# Patient Record
Sex: FEMALE | Race: WHITE | Employment: FULL TIME | URBAN - METROPOLITAN AREA
[De-identification: names, ages, dates, MRNs, and addresses within clinical notes are randomized per-mention and may not be internally consistent; named-entity substitution may affect disease eponyms.]

---

## 2018-05-14 PROBLEM — Z87.42 HISTORY OF ABNORMAL CERVICAL PAP SMEAR: Status: ACTIVE | Noted: 2018-05-14

## 2019-05-22 PROBLEM — N94.6 DYSMENORRHEA: Status: ACTIVE | Noted: 2017-05-10

## 2019-09-08 ENCOUNTER — HOSPITAL ENCOUNTER (EMERGENCY)
Age: 29
Discharge: HOME | End: 2019-09-08
Attending: EMERGENCY MEDICINE
Payer: COMMERCIAL

## 2019-09-08 ENCOUNTER — APPOINTMENT (OUTPATIENT)
Dept: GENERAL RADIOLOGY | Age: 29
End: 2019-09-08
Attending: NURSE PRACTITIONER
Payer: COMMERCIAL

## 2019-09-08 VITALS
DIASTOLIC BLOOD PRESSURE: 80 MMHG | TEMPERATURE: 98.1 F | HEIGHT: 62 IN | RESPIRATION RATE: 16 BRPM | WEIGHT: 150 LBS | HEART RATE: 74 BPM | SYSTOLIC BLOOD PRESSURE: 121 MMHG | BODY MASS INDEX: 27.6 KG/M2 | OXYGEN SATURATION: 100 %

## 2019-09-08 DIAGNOSIS — S90.31XA CONTUSION OF RIGHT FOOT, INITIAL ENCOUNTER: ICD-10-CM
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
## 2019-09-08 DIAGNOSIS — S42.291A CLOSED FRACTURE OF HEAD OF RIGHT HUMERUS, INITIAL ENCOUNTER: ICD-10-CM

## 2019-09-08 PROCEDURE — 99283 EMERGENCY DEPT VISIT LOW MDM: CPT | Performed by: NURSE PRACTITIONER

## 2019-09-08 PROCEDURE — 73060 X-RAY EXAM OF HUMERUS: CPT

## 2019-09-08 PROCEDURE — 73630 X-RAY EXAM OF FOOT: CPT

## 2019-09-08 NOTE — LETTER
[id number] [address] EMERGENCY DEPT 
[address] [facility] [id number] [phone] Work/School Note Date: 9/8/2019 To Whom It May concern: 
 
Buzz Inks was seen and treated today in the emergency room by the following provider(s): 
Attending Provider: Emma Ngo MD 
Nurse Practitioner: GENE Oates. Buzz Inks needs to be excused from work until evaluated by orthopedics.   
 
Sincerely, 
 
 
 
 
GENE Velasco

## 2019-09-08 NOTE — DISCHARGE INSTRUCTIONS
Follow up with orthopedics. Continue with pain medication you were prescribed at [facility]. Return to the emergency department as needed.
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
## 2019-09-08 NOTE — ED NOTES
I have reviewed discharge instructions with the patient. The patient verbalized understanding. Patient left ED via Discharge Method: ambulatory to Home with family. Opportunity for questions and clarification provided. Patient given 0 scripts. To continue your aftercare when you leave the hospital, you may receive an automated call from our care team to check in on how you are doing. This is a free service and part of our promise to provide the best care and service to meet your aftercare needs.  If you have questions, or wish to unsubscribe from this service please call [phone]. Thank you for Choosing our [facility] Emergency Department.
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
## 2019-09-09 PROBLEM — S42.231A CLOSED 3-PART FRACTURE OF SURGICAL NECK OF RIGHT HUMERUS: Status: ACTIVE | Noted: 2019-09-09

## 2019-09-09 NOTE — BRIEF OP NOTE
BRIEF OPERATIVE NOTE    Date of Procedure: 9/12/2019     Preoperative Diagnosis:  CLOSED 4 PART RIGHT PROXIMAL HUMERUS FRACTURE     Postoperative Diagnosis:  SAME    Procedure(s): OPEN REDUCTION, INTERNAL FIXATION RIGHT PROXIMAL HUMERUS FRACTURE    Surgeon(s) and Role:     * Stanislaw Sanchez MD - Primary         Assistant Staff:  Baljinder Cochran CFA      Surgical Staff:  Circ-1: (Unknown)  Scrub Tech-1: (Unknown)  Scrub Tech-2: (Unknown)  Scrub Tech-3: (Unknown)  No case tracking events are documented in the log. Anesthesia:  GENERAL WITH INTERSCALENE BLOCK    Estimated Blood Loss: 493 cc      Complications: NONE    Implants:   Implant Name Type Inv.  Item Serial No.  Lot No. LRB No. Used Action   PLATE HUM PROX STD Raz Say Aron 1636 [id number] --  - [id number]  PLATE HUM PROX STD Raz Say Aron 1636 [id number] --  [id number] [address] [id number] Right 1 Implanted   WIRE K THRD TRCR PT [id number] --  - [id number]  WIRE K THRD TRCR PT 1.6U698SM --  [id number] [address] [id number] Right 2 Implanted   WIRE K TRCR PT 2.1R676KR SS -- 10/PK - [id number]  WIRE K TRCR PT 2.8J204HI SS -- 10/PK [id number] [employer] [place] [id number] Right 1 Implanted   SCR BNE LCK ST T25 3.5X26MM SS --  - [id number]  SCR BNE LCK ST T25 3.5X26MM SS --  [id number] [employer] [place] [id number] Right 4 Implanted   SCR BNE LCK ST T25 3.5X28MM SS --  - [id number]  SCR BNE LCK ST T25 3.5X28MM SS --  [id number] [employer] [place] [id number] Right 3 Implanted   SCR BNE LCK ST T25 3.5X36MM SS --  - [id number]  SCR BNE LCK ST T25 3.5X36MM SS --  [id number] [employer] [place] [id number] Right 3 Implanted   SCR BNE LCK ST T25 3.5X38MM SS --  - [id number]  SCR BNE LCK ST T25 3.5X38MM SS --  [id number] [employer] [place] [id number] Right 1 Implanted   SCR BNE LCK ST T25 3.5X40MM SS --  - [id number]  SCR BNE LCK ST T25 3.5X40MM SS --  [id number] [employer] [place] [id number] Right 2 Implanted   SCR BNE LCK ST T25 3.5X42MM SS --  - [id number]  SCR BNE LCK ST T25 3.5X42MM SS --  [id number] [employer] [place] [id number] Right 1 Implanted SCR BNE LCK ST T25 3.5X50MM SS --  - [id number]  SCR BNE LCK ST T25 3.5X50MM SS --  [id number] [employer] [place] [id number] Right 1 Implanted   SCR BNE LCK ST T25 3.5X44MM SS --  - [id number]  SCR BNE LCK ST T25 3.5X44MM SS --  [id number] [employer] [place] [id number] Right 2 Implanted   SCR BNE LCK ST T25 3.5X46MM SS --  - [id number]  SCR BNE LCK ST T25 3.5X46MM SS --  [id number] [facility] [id number] Right 1 Implanted       Allison Herrera MD
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
## 2019-09-10 ENCOUNTER — HOSPITAL ENCOUNTER (OUTPATIENT)
Dept: SURGERY | Age: 29
Discharge: HOME | End: 2019-09-10

## 2019-09-10 PROBLEM — S42.231A CLOSED 3-PART FRACTURE OF SURGICAL NECK OF RIGHT HUMERUS: Status: RESOLVED | Noted: 2019-09-09 | Resolved: 2019-09-10

## 2019-09-10 PROBLEM — S42.241A CLOSED 4-PART FRACTURE OF SURGICAL NECK OF RIGHT HUMERUS: Status: ACTIVE | Noted: 2019-09-10

## 2019-09-10 RX ORDER — CEFAZOLIN SODIUM/WATER 2 G/20 ML
2 SYRINGE (ML) INTRAVENOUS ONCE
Status: CANCELLED | OUTPATIENT
Start: 2019-09-10 | End: 2019-09-10

## 2019-09-10 RX ORDER — SODIUM CHLORIDE 0.9 % (FLUSH) 0.9 %
5-40 SYRINGE (ML) INJECTION EVERY 8 HOURS
Status: CANCELLED | OUTPATIENT
Start: 2019-09-10

## 2019-09-10 RX ORDER — SODIUM CHLORIDE 0.9 % (FLUSH) 0.9 %
5-40 SYRINGE (ML) INJECTION AS NEEDED
Status: CANCELLED | OUTPATIENT
Start: 2019-09-10

## 2019-09-11 VITALS — WEIGHT: 150 LBS | BODY MASS INDEX: 27.6 KG/M2 | HEIGHT: 62 IN

## 2019-09-11 RX ORDER — HYDROCODONE BITARTRATE AND ACETAMINOPHEN 10; 325 MG/1; MG/1
1 TABLET ORAL
COMMUNITY
End: 2021-07-01

## 2019-09-11 RX ORDER — IBUPROFEN 800 MG/1
800 TABLET ORAL
COMMUNITY
End: 2020-02-03

## 2019-09-11 NOTE — PERIOP NOTES
Patient verified name and . Order for consent found in EHR and matches case posting; patient verifies procedure. Right proximal humerus open reduction internal fixation. Type 1B surgery, PAT phone assessment complete. Orders received. Labs per surgeon: POC glucose and HgbA1C DOS. Labs per anesthesia protocol: none    Patient answered medical/surgical history questions at their best of ability. All prior to admission medications documented in [facility]. Patient instructed to take the following medications the day of surgery according to anesthesia guidelines with a small sip of water: hydrocodone if needed, lexapro, xyzal and philith. Hold all vitamins 7 days prior to surgery and NSAIDS 5 days prior to surgery. Prescription meds to hold: ibuprofen. Patient instructed on the following:  Arrive at A Entrance, time of arrival to be called the day before by 1700  NPO after midnight including gum, mints, and ice chips  Responsible adult must drive patient to the hospital, stay during surgery, and patient will need supervision 24 hours after anesthesia  Use hibiclens in shower the night before surgery and on the morning of surgery  All piercings must be removed prior to arrival.    Leave all valuables (money and jewelry) at home but bring insurance card and ID on  DOS. Do not wear make-up, nail polish, lotions, cologne, perfumes, powders, or oil on skin. Patient teach back successful and patient demonstrates knowledge of instruction.

## 2019-09-12 ENCOUNTER — ANESTHESIA EVENT (OUTPATIENT)
Dept: SURGERY | Age: 29
End: 2019-09-12
Payer: COMMERCIAL

## 2019-09-12 ENCOUNTER — HOSPITAL ENCOUNTER (OUTPATIENT)
Age: 29
Setting detail: OBSERVATION
Discharge: HOME | End: 2019-09-13
Attending: ORTHOPAEDIC SURGERY | Admitting: ORTHOPAEDIC SURGERY
Payer: COMMERCIAL

## 2019-09-12 ENCOUNTER — APPOINTMENT (OUTPATIENT)
Dept: GENERAL RADIOLOGY | Age: 29
End: 2019-09-12
Attending: ORTHOPAEDIC SURGERY
Payer: COMMERCIAL

## 2019-09-12 ENCOUNTER — ANESTHESIA (OUTPATIENT)
Dept: SURGERY | Age: 29
End: 2019-09-12
Payer: COMMERCIAL

## 2019-09-12 PROBLEM — S42.231A 3-PART FRACTURE OF SURGICAL NECK OF RIGHT HUMERUS, INITIAL ENCOUNTER FOR CLOSED FRACTURE: Status: ACTIVE | Noted: 2019-09-12

## 2019-09-12 PROBLEM — S42.231A 3-PART FRACTURE OF SURGICAL NECK OF RIGHT HUMERUS: Status: ACTIVE | Noted: 2019-09-12

## 2019-09-12 LAB
EST. AVERAGE GLUCOSE BLD GHB EST-MCNC: NORMAL MG/DL
GLUCOSE BLD STRIP.AUTO-MCNC: 99 MG/DL (ref 65–100)
HBA1C MFR BLD: 4.6 %
HCG UR QL: NEGATIVE

## 2019-09-12 PROCEDURE — 74011250636 HC RX REV CODE- 250/636: Performed by: ORTHOPAEDIC SURGERY

## 2019-09-12 PROCEDURE — 99218 HC RM OBSERVATION: CPT

## 2019-09-12 PROCEDURE — 77030008848 HC WRE K SYNT -B: Performed by: ORTHOPAEDIC SURGERY

## 2019-09-12 PROCEDURE — 77030029637: Performed by: ORTHOPAEDIC SURGERY

## 2019-09-12 PROCEDURE — 76010000171 HC OR TIME 2 TO 2.5 HR INTENSV-TIER 1: Performed by: ORTHOPAEDIC SURGERY

## 2019-09-12 PROCEDURE — 77030008703 HC TU ET UNCUF COVD -A: Performed by: ANESTHESIOLOGY

## 2019-09-12 PROCEDURE — 82962 GLUCOSE BLOOD TEST: CPT

## 2019-09-12 PROCEDURE — 94760 N-INVAS EAR/PLS OXIMETRY 1: CPT

## 2019-09-12 PROCEDURE — 76210000016 HC OR PH I REC 1 TO 1.5 HR: Performed by: ORTHOPAEDIC SURGERY

## 2019-09-12 PROCEDURE — 77030016570 HC BLNKT BAIR HGGR 3M -B: Performed by: ANESTHESIOLOGY

## 2019-09-12 PROCEDURE — 77030031139 HC SUT VCRL2 J&J -A: Performed by: ORTHOPAEDIC SURGERY

## 2019-09-12 PROCEDURE — 74011250637 HC RX REV CODE- 250/637: Performed by: ORTHOPAEDIC SURGERY

## 2019-09-12 PROCEDURE — 73030 X-RAY EXAM OF SHOULDER: CPT

## 2019-09-12 PROCEDURE — 77030019908 HC STETH ESOPH SIMS -A: Performed by: ANESTHESIOLOGY

## 2019-09-12 PROCEDURE — 77030008477 HC STYL SATN SLP COVD -A: Performed by: ANESTHESIOLOGY

## 2019-09-12 PROCEDURE — C1713 ANCHOR/SCREW BN/BN,TIS/BN: HCPCS | Performed by: ORTHOPAEDIC SURGERY

## 2019-09-12 PROCEDURE — 76942 ECHO GUIDE FOR BIOPSY: CPT | Performed by: ORTHOPAEDIC SURGERY

## 2019-09-12 PROCEDURE — 77030018836 HC SOL IRR NACL ICUM -A: Performed by: ORTHOPAEDIC SURGERY

## 2019-09-12 PROCEDURE — 74011250636 HC RX REV CODE- 250/636: Performed by: ANESTHESIOLOGY

## 2019-09-12 PROCEDURE — 77030018986 HC SUT ETHBND4 J&J -B: Performed by: ORTHOPAEDIC SURGERY

## 2019-09-12 PROCEDURE — 77030013107 HC CUF CRYOCUF DJOR -B: Performed by: ORTHOPAEDIC SURGERY

## 2019-09-12 PROCEDURE — 77030011283 HC ELECTRD NDL COVD -A: Performed by: ORTHOPAEDIC SURGERY

## 2019-09-12 PROCEDURE — 77030039425 HC BLD LARYNG TRULITE DISP TELE -A: Performed by: ANESTHESIOLOGY

## 2019-09-12 PROCEDURE — 77030003602 HC NDL NRV BLK BBMI -B: Performed by: ANESTHESIOLOGY

## 2019-09-12 PROCEDURE — 73060 X-RAY EXAM OF HUMERUS: CPT

## 2019-09-12 PROCEDURE — 77030018673: Performed by: ORTHOPAEDIC SURGERY

## 2019-09-12 PROCEDURE — 77030014058 HC TIP IRR PULSVC ZIMM -A: Performed by: ORTHOPAEDIC SURGERY

## 2019-09-12 PROCEDURE — 76010010054 HC POST OP PAIN BLOCK: Performed by: ORTHOPAEDIC SURGERY

## 2019-09-12 PROCEDURE — 77030040361 HC SLV COMPR DVT MDII -B: Performed by: ORTHOPAEDIC SURGERY

## 2019-09-12 PROCEDURE — 77030020163 HC SEAL HEMSTAT HALY -B: Performed by: ORTHOPAEDIC SURGERY

## 2019-09-12 PROCEDURE — 77030013708 HC HNDPC SUC IRR PULS STRY –B: Performed by: ORTHOPAEDIC SURGERY

## 2019-09-12 PROCEDURE — 94762 N-INVAS EAR/PLS OXIMTRY CONT: CPT

## 2019-09-12 PROCEDURE — 83036 HEMOGLOBIN GLYCOSYLATED A1C: CPT

## 2019-09-12 PROCEDURE — 76060000035 HC ANESTHESIA 2 TO 2.5 HR: Performed by: ORTHOPAEDIC SURGERY

## 2019-09-12 PROCEDURE — 77030008847 HC WRE K SYNT -A: Performed by: ORTHOPAEDIC SURGERY

## 2019-09-12 PROCEDURE — 74011250636 HC RX REV CODE- 250/636

## 2019-09-12 PROCEDURE — 77030029359 HC PRB ESOPH TEMP CATH ANTM -F: Performed by: ANESTHESIOLOGY

## 2019-09-12 PROCEDURE — 74011000250 HC RX REV CODE- 250

## 2019-09-12 PROCEDURE — 77030018547 HC SUT ETHBND1 J&J -B: Performed by: ORTHOPAEDIC SURGERY

## 2019-09-12 PROCEDURE — 81025 URINE PREGNANCY TEST: CPT

## 2019-09-12 DEVICE — SCREW BNE L46MM DIA3.5MM S STL ST FULL THRD T15 STARDRV: Type: IMPLANTABLE DEVICE | Site: SHOULDER | Status: FUNCTIONAL

## 2019-09-12 DEVICE — SCREW BNE L42MM DIA3.5MM CORT S STL ST LOK FULL THRD: Type: IMPLANTABLE DEVICE | Site: SHOULDER | Status: FUNCTIONAL

## 2019-09-12 DEVICE — SCREW BNE L38MM DIA3.5MM CORT S STL ST LOK FULL THRD: Type: IMPLANTABLE DEVICE | Site: SHOULDER | Status: FUNCTIONAL

## 2019-09-12 DEVICE — K WIRE FIX L150MM DIA2MM S STL W/ TRCR PNT: Type: IMPLANTABLE DEVICE | Site: SHOULDER | Status: FUNCTIONAL

## 2019-09-12 DEVICE — GII QUICKANCHOR PLUS SIZE 2 (5 METRIC) PANACRYL BRAIDED ABSORBABLE SUTURE 36 INCHES (91CM), DOUBLE ARMED WITH CP-2 NEEDLES, WITH DISPOSABLE INSERTER.
Type: IMPLANTABLE DEVICE | Site: SHOULDER | Status: FUNCTIONAL
Brand: GII QUICKANCHOR PANACRYL

## 2019-09-12 DEVICE — SCREW BNE L36MM DIA3.5MM CORT S STL ST LOK FULL THRD: Type: IMPLANTABLE DEVICE | Site: SHOULDER | Status: FUNCTIONAL

## 2019-09-12 DEVICE — PLATE BNE L114MM 5 H STD PROX HUM S STL LOK COMPR FOR 3.5MM: Type: IMPLANTABLE DEVICE | Site: SHOULDER | Status: FUNCTIONAL

## 2019-09-12 DEVICE — SCREW BNE L28MM DIA3.5MM CORT S STL ST LOK FULL THRD: Type: IMPLANTABLE DEVICE | Site: SHOULDER | Status: FUNCTIONAL

## 2019-09-12 DEVICE — K WIRE FIX L150MM DIA1.6MM S STL THRD TRCR PNT: Type: IMPLANTABLE DEVICE | Site: SHOULDER | Status: FUNCTIONAL

## 2019-09-12 RX ORDER — DIPHENHYDRAMINE HYDROCHLORIDE 50 MG/ML
INJECTION, SOLUTION INTRAMUSCULAR; INTRAVENOUS AS NEEDED
Status: DISCONTINUED | OUTPATIENT
Start: 2019-09-12 | End: 2019-09-12 | Stop reason: HOSPADM

## 2019-09-12 RX ORDER — SODIUM CHLORIDE 0.9 % (FLUSH) 0.9 %
5-40 SYRINGE (ML) INJECTION EVERY 8 HOURS
Status: CANCELLED | OUTPATIENT
Start: 2019-09-12

## 2019-09-12 RX ORDER — DEXAMETHASONE SODIUM PHOSPHATE 4 MG/ML
INJECTION, SOLUTION INTRA-ARTICULAR; INTRALESIONAL; INTRAMUSCULAR; INTRAVENOUS; SOFT TISSUE
Status: COMPLETED | OUTPATIENT
Start: 2019-09-12 | End: 2019-09-12

## 2019-09-12 RX ORDER — DOCUSATE SODIUM 100 MG/1
100 CAPSULE, LIQUID FILLED ORAL 2 TIMES DAILY
Status: DISCONTINUED | OUTPATIENT
Start: 2019-09-13 | End: 2019-09-13 | Stop reason: HOSPADM

## 2019-09-12 RX ORDER — LANOLIN ALCOHOL/MO/W.PET/CERES
1 CREAM (GRAM) TOPICAL 2 TIMES DAILY WITH MEALS
Status: DISCONTINUED | OUTPATIENT
Start: 2019-09-13 | End: 2019-09-13 | Stop reason: HOSPADM

## 2019-09-12 RX ORDER — MIDAZOLAM HYDROCHLORIDE 1 MG/ML
2 INJECTION, SOLUTION INTRAMUSCULAR; INTRAVENOUS ONCE
Status: COMPLETED | OUTPATIENT
Start: 2019-09-12 | End: 2019-09-12

## 2019-09-12 RX ORDER — TEMAZEPAM 15 MG/1
15 CAPSULE ORAL
Status: DISCONTINUED | OUTPATIENT
Start: 2019-09-12 | End: 2019-09-13 | Stop reason: HOSPADM

## 2019-09-12 RX ORDER — SODIUM CHLORIDE 9 MG/ML
75 INJECTION, SOLUTION INTRAVENOUS CONTINUOUS
Status: DISCONTINUED | OUTPATIENT
Start: 2019-09-12 | End: 2019-09-13 | Stop reason: HOSPADM

## 2019-09-12 RX ORDER — OXYCODONE AND ACETAMINOPHEN 10; 325 MG/1; MG/1
1 TABLET ORAL AS NEEDED
Status: DISCONTINUED | OUTPATIENT
Start: 2019-09-12 | End: 2019-09-12 | Stop reason: HOSPADM

## 2019-09-12 RX ORDER — PROPOFOL 10 MG/ML
INJECTION, EMULSION INTRAVENOUS AS NEEDED
Status: DISCONTINUED | OUTPATIENT
Start: 2019-09-12 | End: 2019-09-12 | Stop reason: HOSPADM

## 2019-09-12 RX ORDER — SODIUM CHLORIDE 0.9 % (FLUSH) 0.9 %
5-40 SYRINGE (ML) INJECTION EVERY 8 HOURS
Status: DISCONTINUED | OUTPATIENT
Start: 2019-09-12 | End: 2019-09-12 | Stop reason: HOSPADM

## 2019-09-12 RX ORDER — FENTANYL CITRATE 50 UG/ML
100 INJECTION, SOLUTION INTRAMUSCULAR; INTRAVENOUS ONCE
Status: COMPLETED | OUTPATIENT
Start: 2019-09-12 | End: 2019-09-12

## 2019-09-12 RX ORDER — LIDOCAINE HYDROCHLORIDE 20 MG/ML
INJECTION, SOLUTION EPIDURAL; INFILTRATION; INTRACAUDAL; PERINEURAL AS NEEDED
Status: DISCONTINUED | OUTPATIENT
Start: 2019-09-12 | End: 2019-09-12 | Stop reason: HOSPADM

## 2019-09-12 RX ORDER — HYDROMORPHONE HYDROCHLORIDE 1 MG/ML
1 INJECTION, SOLUTION INTRAMUSCULAR; INTRAVENOUS; SUBCUTANEOUS
Status: DISCONTINUED | OUTPATIENT
Start: 2019-09-12 | End: 2019-09-13 | Stop reason: HOSPADM

## 2019-09-12 RX ORDER — GLYCOPYRROLATE 0.2 MG/ML
INJECTION INTRAMUSCULAR; INTRAVENOUS AS NEEDED
Status: DISCONTINUED | OUTPATIENT
Start: 2019-09-12 | End: 2019-09-12 | Stop reason: HOSPADM

## 2019-09-12 RX ORDER — OXYCODONE HYDROCHLORIDE 5 MG/1
5 TABLET ORAL
Status: DISCONTINUED | OUTPATIENT
Start: 2019-09-12 | End: 2019-09-12 | Stop reason: HOSPADM

## 2019-09-12 RX ORDER — EPHEDRINE SULFATE 50 MG/ML
INJECTION, SOLUTION INTRAVENOUS AS NEEDED
Status: DISCONTINUED | OUTPATIENT
Start: 2019-09-12 | End: 2019-09-12 | Stop reason: HOSPADM

## 2019-09-12 RX ORDER — HYDROMORPHONE HYDROCHLORIDE 2 MG/ML
0.5 INJECTION, SOLUTION INTRAMUSCULAR; INTRAVENOUS; SUBCUTANEOUS
Status: DISCONTINUED | OUTPATIENT
Start: 2019-09-12 | End: 2019-09-12 | Stop reason: HOSPADM

## 2019-09-12 RX ORDER — SODIUM CHLORIDE 0.9 % (FLUSH) 0.9 %
5-40 SYRINGE (ML) INJECTION AS NEEDED
Status: DISCONTINUED | OUTPATIENT
Start: 2019-09-12 | End: 2019-09-13 | Stop reason: HOSPADM

## 2019-09-12 RX ORDER — HYDROMORPHONE HYDROCHLORIDE 2 MG/1
2 TABLET ORAL
Status: DISCONTINUED | OUTPATIENT
Start: 2019-09-12 | End: 2019-09-13 | Stop reason: HOSPADM

## 2019-09-12 RX ORDER — ROCURONIUM BROMIDE 10 MG/ML
INJECTION, SOLUTION INTRAVENOUS AS NEEDED
Status: DISCONTINUED | OUTPATIENT
Start: 2019-09-12 | End: 2019-09-12 | Stop reason: HOSPADM

## 2019-09-12 RX ORDER — SODIUM CHLORIDE 0.9 % (FLUSH) 0.9 %
5-40 SYRINGE (ML) INJECTION AS NEEDED
Status: DISCONTINUED | OUTPATIENT
Start: 2019-09-12 | End: 2019-09-12 | Stop reason: HOSPADM

## 2019-09-12 RX ORDER — ROPIVACAINE HYDROCHLORIDE 5 MG/ML
INJECTION, SOLUTION EPIDURAL; INFILTRATION; PERINEURAL
Status: COMPLETED | OUTPATIENT
Start: 2019-09-12 | End: 2019-09-12

## 2019-09-12 RX ORDER — DEXAMETHASONE SODIUM PHOSPHATE 4 MG/ML
INJECTION, SOLUTION INTRA-ARTICULAR; INTRALESIONAL; INTRAMUSCULAR; INTRAVENOUS; SOFT TISSUE AS NEEDED
Status: DISCONTINUED | OUTPATIENT
Start: 2019-09-12 | End: 2019-09-12 | Stop reason: HOSPADM

## 2019-09-12 RX ORDER — NEOSTIGMINE METHYLSULFATE 1 MG/ML
INJECTION INTRAVENOUS AS NEEDED
Status: DISCONTINUED | OUTPATIENT
Start: 2019-09-12 | End: 2019-09-12 | Stop reason: HOSPADM

## 2019-09-12 RX ORDER — CEFAZOLIN SODIUM/WATER 2 G/20 ML
2 SYRINGE (ML) INTRAVENOUS ONCE
Status: COMPLETED | OUTPATIENT
Start: 2019-09-12 | End: 2019-09-12

## 2019-09-12 RX ORDER — FACIAL-BODY WIPES
10 EACH TOPICAL DAILY PRN
Status: DISCONTINUED | OUTPATIENT
Start: 2019-09-12 | End: 2019-09-13 | Stop reason: HOSPADM

## 2019-09-12 RX ORDER — CEFAZOLIN SODIUM/WATER 2 G/20 ML
2 SYRINGE (ML) INTRAVENOUS EVERY 8 HOURS
Status: COMPLETED | OUTPATIENT
Start: 2019-09-12 | End: 2019-09-13

## 2019-09-12 RX ORDER — ONDANSETRON 2 MG/ML
INJECTION INTRAMUSCULAR; INTRAVENOUS AS NEEDED
Status: DISCONTINUED | OUTPATIENT
Start: 2019-09-12 | End: 2019-09-12 | Stop reason: HOSPADM

## 2019-09-12 RX ORDER — PROMETHAZINE HYDROCHLORIDE 25 MG/1
25 TABLET ORAL
Status: DISCONTINUED | OUTPATIENT
Start: 2019-09-12 | End: 2019-09-13 | Stop reason: HOSPADM

## 2019-09-12 RX ORDER — SODIUM CHLORIDE, SODIUM LACTATE, POTASSIUM CHLORIDE, CALCIUM CHLORIDE 600; 310; 30; 20 MG/100ML; MG/100ML; MG/100ML; MG/100ML
75 INJECTION, SOLUTION INTRAVENOUS CONTINUOUS
Status: DISCONTINUED | OUTPATIENT
Start: 2019-09-12 | End: 2019-09-12 | Stop reason: HOSPADM

## 2019-09-12 RX ORDER — HYDROMORPHONE HYDROCHLORIDE 4 MG/1
4 TABLET ORAL
Status: DISCONTINUED | OUTPATIENT
Start: 2019-09-12 | End: 2019-09-13 | Stop reason: HOSPADM

## 2019-09-12 RX ADMIN — ROPIVACAINE HYDROCHLORIDE 20 ML: 5 INJECTION, SOLUTION EPIDURAL; INFILTRATION; PERINEURAL at 12:44

## 2019-09-12 RX ADMIN — Medication 2 G: at 23:32

## 2019-09-12 RX ADMIN — MIDAZOLAM 2 MG: 1 INJECTION INTRAMUSCULAR; INTRAVENOUS at 12:40

## 2019-09-12 RX ADMIN — HYDROMORPHONE HYDROCHLORIDE 4 MG: 4 TABLET ORAL at 23:05

## 2019-09-12 RX ADMIN — NEOSTIGMINE METHYLSULFATE 3 MG: 1 INJECTION INTRAVENOUS at 16:19

## 2019-09-12 RX ADMIN — SODIUM CHLORIDE, SODIUM LACTATE, POTASSIUM CHLORIDE, AND CALCIUM CHLORIDE: 600; 310; 30; 20 INJECTION, SOLUTION INTRAVENOUS at 14:06

## 2019-09-12 RX ADMIN — Medication 2 G: at 14:06

## 2019-09-12 RX ADMIN — LIDOCAINE HYDROCHLORIDE 80 MG: 20 INJECTION, SOLUTION EPIDURAL; INFILTRATION; INTRACAUDAL; PERINEURAL at 14:16

## 2019-09-12 RX ADMIN — ONDANSETRON 4 MG: 2 INJECTION INTRAMUSCULAR; INTRAVENOUS at 15:42

## 2019-09-12 RX ADMIN — DEXAMETHASONE SODIUM PHOSPHATE 8 MG: 4 INJECTION, SOLUTION INTRA-ARTICULAR; INTRALESIONAL; INTRAMUSCULAR; INTRAVENOUS; SOFT TISSUE at 15:06

## 2019-09-12 RX ADMIN — FENTANYL CITRATE 100 MCG: 50 INJECTION INTRAMUSCULAR; INTRAVENOUS at 12:40

## 2019-09-12 RX ADMIN — EPHEDRINE SULFATE 10 MG: 50 INJECTION, SOLUTION INTRAVENOUS at 15:44

## 2019-09-12 RX ADMIN — GLYCOPYRROLATE 0.4 MG: 0.2 INJECTION INTRAMUSCULAR; INTRAVENOUS at 16:19

## 2019-09-12 RX ADMIN — ROCURONIUM BROMIDE 40 MG: 10 INJECTION, SOLUTION INTRAVENOUS at 14:16

## 2019-09-12 RX ADMIN — EPHEDRINE SULFATE 10 MG: 50 INJECTION, SOLUTION INTRAVENOUS at 14:52

## 2019-09-12 RX ADMIN — SODIUM CHLORIDE 75 ML/HR: 900 INJECTION, SOLUTION INTRAVENOUS at 20:22

## 2019-09-12 RX ADMIN — SODIUM CHLORIDE, SODIUM LACTATE, POTASSIUM CHLORIDE, AND CALCIUM CHLORIDE 75 ML/HR: 600; 310; 30; 20 INJECTION, SOLUTION INTRAVENOUS at 10:14

## 2019-09-12 RX ADMIN — Medication 10 ML: at 23:33

## 2019-09-12 RX ADMIN — DEXAMETHASONE SODIUM PHOSPHATE 5 MG: 4 INJECTION, SOLUTION INTRA-ARTICULAR; INTRALESIONAL; INTRAMUSCULAR; INTRAVENOUS; SOFT TISSUE at 12:44

## 2019-09-12 RX ADMIN — DIPHENHYDRAMINE HYDROCHLORIDE 12.5 MG: 50 INJECTION, SOLUTION INTRAMUSCULAR; INTRAVENOUS at 15:16

## 2019-09-12 RX ADMIN — PROPOFOL 150 MG: 10 INJECTION, EMULSION INTRAVENOUS at 14:16

## 2019-09-12 RX ADMIN — SODIUM CHLORIDE, SODIUM LACTATE, POTASSIUM CHLORIDE, AND CALCIUM CHLORIDE: 600; 310; 30; 20 INJECTION, SOLUTION INTRAVENOUS at 15:22

## 2019-09-12 NOTE — PROGRESS NOTES
TRANSFER - IN REPORT:    Verbal report received from Lela Duque (name) on Lenore Vences  being received from PACU(unit) for routine post - op      Report consisted of patients Situation, Background, Assessment and   Recommendations(SBAR). Information from the following report(s) SBAR, Kardex, OR Summary, Intake/Output and MAR was reviewed with the receiving nurse. Opportunity for questions and clarification was provided. Assessment to be completed upon patients arrival to unit and care assumed.

## 2019-09-12 NOTE — ANESTHESIA PROCEDURE NOTES
Peripheral Block    Start time: 9/12/2019 12:40 PM  End time: 9/12/2019 12:44 PM  Performed by: Carolina Lovelace MD  Authorized by: Carolina Lovelace MD       Pre-procedure: Indications: at surgeon's request and post-op pain management    Preanesthetic Checklist: patient identified, risks and benefits discussed, site marked, timeout performed, anesthesia consent given and patient being monitored    Timeout Time: 12:40          Block Type:   Block Type:   Interscalene  Laterality:  Right  Monitoring:  Standard ASA monitoring, continuous pulse ox, frequent vital sign checks, heart rate, oxygen and responsive to questions  Injection Technique:  Single shot  Procedures: ultrasound guided and nerve stimulator    Patient Position: supine  Prep: chlorhexidine    Location:  Interscalene  Needle Type:  Stimuplex  Needle Gauge:  20 G    Assessment:  Number of attempts:  1  Injection Assessment:  Incremental injection every 5 mL, local visualized surrounding nerve on ultrasound, negative aspiration for blood, no intravascular symptoms, no paresthesia and ultrasound image on chart  Patient tolerance:  Patient tolerated the procedure well with no immediate complications

## 2019-09-12 NOTE — H&P
Date of Surgery Update:  Aubrey Bhagat was seen and examined. History and physical has been reviewed. The patient has been examined.  There have been no significant clinical changes since the completion of the originally dated History and Physical.    Signed By: Dana Morrow MD     September 12, 2019 10:07 AM

## 2019-09-12 NOTE — PROGRESS NOTES
09/12/19 1825   Oxygen Therapy   O2 Sat (%) 94 %   Pulse via Oximetry 102 beats per minute   O2 Device Room air   O2 Flow Rate (L/min) 0 l/min   Good npc. Pt working on IS. Pt encouraged to do 10 breaths per hour while awake on IS. No respiratory distress noted at this time.

## 2019-09-12 NOTE — ROUTINE PROCESS
TRANSFER - OUT REPORT:    Verbal report given to lisandro rn(name) on Reid Gudino  being transferred to ortho(unit) for routine post - op       Report consisted of patients Situation, Background, Assessment and   Recommendations(SBAR). Information from the following report(s) SBAR was reviewed with the receiving nurse. Lines:   Peripheral IV 09/12/19 Left Hand (Active)   Site Assessment Clean, dry, & intact 9/12/2019  4:29 PM   Phlebitis Assessment 0 9/12/2019  4:29 PM   Infiltration Assessment 0 9/12/2019  4:29 PM   Dressing Status Clean, dry, & intact 9/12/2019  4:29 PM   Dressing Type Transparent 9/12/2019  4:29 PM   Hub Color/Line Status Green 9/12/2019  4:29 PM        Opportunity for questions and clarification was provided.       Patient transported with:   O2 @ 0 liters

## 2019-09-12 NOTE — ANESTHESIA POSTPROCEDURE EVALUATION
Procedure(s):  OPEN REDUCTION, INTERNAL FIXATION RIGHT PROXIMAL HUMERUS FRACTURE .     general    Anesthesia Post Evaluation      Multimodal analgesia: multimodal analgesia used between 6 hours prior to anesthesia start to PACU discharge  Patient location during evaluation: PACU  Patient participation: complete - patient participated  Level of consciousness: awake  Pain management: adequate  Airway patency: patent  Anesthetic complications: no  Cardiovascular status: acceptable  Respiratory status: spontaneous ventilation and acceptable  Hydration status: acceptable  Post anesthesia nausea and vomiting:  none      Vitals Value Taken Time   /66 9/12/2019  4:59 PM   Temp 36.9 °C (98.4 °F) 9/12/2019  4:29 PM   Pulse 86 9/12/2019  4:59 PM   Resp 16 9/12/2019  4:59 PM   SpO2 99 % 9/12/2019  4:59 PM

## 2019-09-12 NOTE — ANESTHESIA PREPROCEDURE EVALUATION
Relevant Problems   No relevant active problems       Anesthetic History   No history of anesthetic complications            Review of Systems / Medical History  Patient summary reviewed and pertinent labs reviewed    Pulmonary  Within defined limits                 Neuro/Psych   Within defined limits           Cardiovascular                  Exercise tolerance: >4 METS     GI/Hepatic/Renal     GERD: well controlled           Endo/Other        Obesity     Other Findings              Physical Exam    Airway  Mallampati: II  TM Distance: > 6 cm  Neck ROM: normal range of motion   Mouth opening: Normal     Cardiovascular    Rhythm: regular           Dental  No notable dental hx       Pulmonary                 Abdominal  GI exam deferred       Other Findings            Anesthetic Plan    ASA: 2  Anesthesia type: general - interscalene block      Post-op pain plan if not by surgeon: peripheral nerve block single    Induction: Intravenous  Anesthetic plan and risks discussed with: Patient

## 2019-09-12 NOTE — DISCHARGE SUMMARY
[facility] [facility] Discharge Summary      Patient ID:  Neil Adams  [id number]  30 y.o.  1990    Allergies: Latex; Nickel; and Sulfa (sulfonamide antibiotics)    Admit date: 9/12/2019    Discharge date and time: 9/13/2019     Admitting Physician: Marquis Sergio MD     Discharge Physician: Marquis Sergio MD      * Admission Diagnoses: Closed 3-part fracture of surgical neck of right humerus, initial encounter [S42.231A]  3-part fracture of surgical neck of right humerus, initial encounter for closed fracture [S42.231A]  3-part fracture of surgical neck of right humerus [S42.231A]    * Discharge Diagnoses:   Hospital Problems as of 9/13/2019 Date Reviewed: 9/12/2019          Codes Class Noted - Resolved POA    3-part fracture of surgical neck of right humerus, initial encounter for closed fracture ICD-10-CM: S42.231A  ICD-9-CM: 812.01  9/12/2019 - Present Unknown        3-part fracture of surgical neck of right humerus ICD-10-CM: W77.030E  ICD-9-CM: 812.01  9/12/2019 - Present Unknown        * (Principal) Closed 4-part fracture of surgical neck of right humerus ICD-10-CM: J10.963U  ICD-9-CM: 812.01  9/10/2019 - Present Yes        RESOLVED: Closed 3-part fracture of surgical neck of right humerus ICD-10-CM: D37.065L  ICD-9-CM: 812.01  9/9/2019 - 9/10/2019 Yes              Surgeon: Marquis Sergio MD          * Procedure: Procedure(s):  OPEN REDUCTION, INTERNAL FIXATION RIGHT PROXIMAL HUMERUS FRACTURE            Perioperative Antibiotics: Ancef  __X_                                                Vancomycin  ___          Post Op complications: none        * Discharge Condition: good  Wound appears to be healing without any evidence of infection.          * Discharged to: Home    * Follow-up Care/Discharge instructions:  - Resume pre hospital diet            - Resume home medications per medical continuation form     CONTINUE PHYSICAL THERAPY  SLING RIGHT SHOULDER  - Follow up in office as scheduled Signed:  Jessie Blandon MD  9/13/2019  1:15 PM

## 2019-09-12 NOTE — PROGRESS NOTES
Shift assessment complete. Call light within reach, side rails up x3, wheels locked, and bed low to ground. Pt RUE numb and without sensation. +2 radial pulse bilaterally. Pain controlled at this time. IS at bedside. No needs at this time.

## 2019-09-13 VITALS
RESPIRATION RATE: 18 BRPM | TEMPERATURE: 98.6 F | OXYGEN SATURATION: 97 % | BODY MASS INDEX: 29.44 KG/M2 | WEIGHT: 160 LBS | HEART RATE: 84 BPM | DIASTOLIC BLOOD PRESSURE: 77 MMHG | HEIGHT: 62 IN | SYSTOLIC BLOOD PRESSURE: 130 MMHG

## 2019-09-13 PROCEDURE — 97161 PT EVAL LOW COMPLEX 20 MIN: CPT

## 2019-09-13 PROCEDURE — 74011250637 HC RX REV CODE- 250/637: Performed by: ORTHOPAEDIC SURGERY

## 2019-09-13 PROCEDURE — 74011250636 HC RX REV CODE- 250/636: Performed by: ORTHOPAEDIC SURGERY

## 2019-09-13 PROCEDURE — 99218 HC RM OBSERVATION: CPT

## 2019-09-13 PROCEDURE — 94762 N-INVAS EAR/PLS OXIMTRY CONT: CPT

## 2019-09-13 RX ADMIN — Medication 2 G: at 06:19

## 2019-09-13 RX ADMIN — HYDROMORPHONE HYDROCHLORIDE 4 MG: 4 TABLET ORAL at 06:19

## 2019-09-13 RX ADMIN — TEMAZEPAM 15 MG: 15 CAPSULE ORAL at 01:38

## 2019-09-13 RX ADMIN — HYDROMORPHONE HYDROCHLORIDE 1 MG: 1 INJECTION, SOLUTION INTRAMUSCULAR; INTRAVENOUS; SUBCUTANEOUS at 04:20

## 2019-09-13 RX ADMIN — HYDROMORPHONE HYDROCHLORIDE 1 MG: 1 INJECTION, SOLUTION INTRAMUSCULAR; INTRAVENOUS; SUBCUTANEOUS at 10:50

## 2019-09-13 RX ADMIN — DOCUSATE SODIUM 100 MG: 100 CAPSULE, LIQUID FILLED ORAL at 09:06

## 2019-09-13 RX ADMIN — HYDROMORPHONE HYDROCHLORIDE 4 MG: 4 TABLET ORAL at 09:07

## 2019-09-13 RX ADMIN — FERROUS SULFATE TAB 325 MG (65 MG ELEMENTAL FE) 325 MG: 325 (65 FE) TAB at 09:06

## 2019-09-13 RX ADMIN — HYDROMORPHONE HYDROCHLORIDE 1 MG: 1 INJECTION, SOLUTION INTRAMUSCULAR; INTRAVENOUS; SUBCUTANEOUS at 01:38

## 2019-09-13 NOTE — PROGRESS NOTES
Shift assessment completed. Pt is alert & oriented x4. Able to verbalize needs. Resting quietly with no distress noted. [facility], [facility] and intact. Ice Pack provided. Neurovascular and peripheral vascular checks WNL. Patient is able to dorsi/planter flex bilaterally with +2 pedal pulses. Patient voiding  clear yellow urine without difficulty. Incentive spirometry at bedside. Patient encouraged to use hourly x 10 repetitions. Pain is being managed with Dilaudid, patient tolerating well. Bed locked and lowered. Call light within reach. Patient instructed to call for assistance. Pt verbalizes understanding. Will monitor.

## 2019-09-13 NOTE — PROGRESS NOTES
Discharge instructions provided to patient and family  All questions were answered  Pt d/c with prescriptions
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
## 2019-09-13 NOTE — PROGRESS NOTES
Orthopedic Joint Progress Note    2019  Admit Date: 2019  Admit Diagnosis: Closed 3-part fracture of surgical neck of right humerus, initial encounter [S42.231A]  3-part fracture of surgical neck of right humerus, initial encounter for closed fracture [S42.231A]  3-part fracture of surgical neck of right humerus [S42.231A]    1 Day Post-Op    Subjective: doing well some pain     Girtha Corrie     Review of Systems: Pertinent items are noted in HPI. Objective:     PT/OT:     PATIENT MOBILITY                           Vital Signs:    Blood pressure 130/81, pulse 93, temperature 98.3 °F (36.8 °C), resp. rate 16, height 5' 2\" (1.575 m), weight 72.6 kg (160 lb), last menstrual period 2019, SpO2 96 %.   Temp (24hrs), Av.6 °F (37 °C), Min:98.3 °F (36.8 °C), Max:98.9 °F (37.2 °C)      Pain Control:   Pain Assessment  Pain Scale 1: Numeric (0 - 10)  Pain Intensity 1: 6  Pain Location 1: Shoulder  Pain Orientation 1: Right  Pain Description 1: Constant  Pain Intervention(s) 1: Medication (see MAR)    Meds:  Current Facility-Administered Medications   Medication Dose Route Frequency    0.9% sodium chloride infusion  75 mL/hr IntraVENous CONTINUOUS    sodium chloride (NS) flush 5-40 mL  5-40 mL IntraVENous PRN    bisacodyl (DULCOLAX) suppository 10 mg  10 mg Rectal DAILY PRN    sodium phosphate (FLEET'S) enema 1 Enema  1 Enema Rectal PRN    promethazine (PHENERGAN) tablet 25 mg  25 mg Oral Q4H PRN    docusate sodium (COLACE) capsule 100 mg  100 mg Oral BID    ferrous sulfate tablet 325 mg  1 Tab Oral BID WITH MEALS    temazepam (RESTORIL) capsule 15 mg  15 mg Oral QHS PRN    HYDROmorphone (DILAUDID) tablet 4 mg  4 mg Oral Q3H PRN    HYDROmorphone (DILAUDID) tablet 2 mg  2 mg Oral Q3H PRN    HYDROmorphone (PF) (DILAUDID) injection 1 mg  1 mg IntraVENous Q1H PRN        LAB:    No results found for: INR  No results found for: HGB, HGBEXT, HGBEXT, HGBEXT    Wound Shoulder Right (Active) Dressing Status Clean, dry, and intact 9/12/2019  8:15 PM   Dressing Type Aquacel 9/12/2019  8:15 PM   Number of days: 1         Physical Exam:  No significant changes    Assessment:      Principal Problem:    Closed 4-part fracture of surgical neck of right humerus (9/10/2019)    Active Problems:    3-part fracture of surgical neck of right humerus, initial encounter for closed fracture (9/12/2019)      3-part fracture of surgical neck of right humerus (9/12/2019)         Plan:     Continue PT/OT/Rehab  Discharge home    Patient Expects to be Discharged to[de-identified] Other (comment)(OR)

## 2019-09-13 NOTE — DISCHARGE INSTRUCTIONS
DISCHARGE SUMMARY from Nurse    The following personal items collected during your admission are returned to you:   Dental Appliance: Dental Appliances: Lowers; Other (comment) (bridge )  Vision: Visual Aid: Glasses  Hearing Aid:   na  Jewelry: Jewelry: None  Clothing: Clothing: At bedside  Other Valuables: Other Valuables: None  Valuables sent to safe:   na          PATIENT INSTRUCTIONS:    New Medications:    Dilaudid 2 mg tabs Take 1-2 tabs every 4-6 hrs as needed for pain. Phenergan 25 mg tabs Take 1 tab every 8 hrs as needed for nausea. Ambien 10 mg tabs Take 1 tab at bedtime as needed for sleep. After general anesthesia or intravenous sedation, for 24 hours or while taking prescription Narcotics:  · Limit your activities  · Do not drive and operate hazardous machinery  · Do not make important personal or business decisions  · Do  not drink alcoholic beverages  · If you have not urinated within 8 hours after discharge, please contact your surgeon on call. Report the following to your surgeon:  · Excessive pain, swelling, redness or odor of or around the surgical area  · Temperature over 101  · Nausea and vomiting lasting longer than 4 hours or if unable to take medications  · Any signs of decreased circulation or nerve impairment to extremity: change in color, persistent  numbness, tingling, coldness or increase pain  · Any questions, call office @ [phone]. What to do at Home:  Recommended activity: activity as tolerated, as instructed per Dr. Keisha Ribeiro. Continue with exercises taught by Physical Therapy. Resume per hospital diet. Wear sling to right arm. Use ice and elevate arm to decrease pain and swelling. If you experience any of the following symptoms temp>101, pain unrelieved by meds, or persistent nausea or vomitting, please follow up with Dr. Keisha Ribeiro @ [phone]. *  Please give a list of your current medications to your Primary Care Provider.     *  Please update this list whenever your medications are discontinued, doses are      changed, or new medications (including over-the-counter products) are added. *  Please carry medication information at all times in case of emergency situations. These are general instructions for a healthy lifestyle:    No smoking/ No tobacco products/ Avoid exposure to second hand smoke    Surgeon General's Warning:  Quitting smoking now greatly reduces serious risk to your health. Obesity, smoking, and sedentary lifestyle greatly increases your risk for illness    A healthy diet, regular physical exercise & weight monitoring are important for maintaining a healthy lifestyle    You may be retaining fluid if you have a history of heart failure or if you experience any of the following symptoms:  Weight gain of 3 pounds or more overnight or 5 pounds in a week, increased swelling in our hands or feet or shortness of breath while lying flat in bed. Please call your doctor as soon as you notice any of these symptoms; do not wait until your next office visit. Recognize signs and symptoms of STROKE:    F-face looks uneven    A-arms unable to move or move unevenly    S-speech slurred or non-existent    T-time-call 911 as soon as signs and symptoms begin-DO NOT go       Back to bed or wait to see if you get better-TIME IS BRAIN. The discharge information has been reviewed with the patient. The patient verbalized understanding. DISCHARGE SUMMARY from Nurse    The following personal items collected during your admission are returned to you:   Dental Appliance: Dental Appliances: Lowers; Other (comment) (bridge )  Vision: Visual Aid: Glasses  Hearing Aid:   na  Jewelry: Jewelry: None  Clothing: Clothing: At bedside  Other Valuables: Other Valuables: None  Valuables sent to safe:   na          PATIENT INSTRUCTIONS:    New Medications:    Dilaudid 2 mg tabs Take 1-2 tabs every 4-6 hrs as needed for pain.   Phenergan 25 mg tabs Take 1 tab every 8 hrs as needed for nausea. Ambien 10 mg tabs Take 1 tab at bedtime as needed for sleep. After general anesthesia or intravenous sedation, for 24 hours or while taking prescription Narcotics:  · Limit your activities  · Do not drive and operate hazardous machinery  · Do not make important personal or business decisions  · Do  not drink alcoholic beverages  · If you have not urinated within 8 hours after discharge, please contact your surgeon on call. Report the following to your surgeon:  · Excessive pain, swelling, redness or odor of or around the surgical area  · Temperature over 101  · Nausea and vomiting lasting longer than 4 hours or if unable to take medications  · Any signs of decreased circulation or nerve impairment to extremity: change in color, persistent  numbness, tingling, coldness or increase pain  · Any questions, call office @ [phone]. What to do at Home:  Recommended activity: activity as tolerated, as instructed per Dr. Leighton Balbuena. Continue with exercises taught by Physical Therapy. Resume per hospital diet. Wear sling to right arm. Use ice and elevate arm to decrease pain and swelling. If you experience any of the following symptoms temp>101, pain unrelieved by meds, or persistent nausea or vomitting, please follow up with Dr. Leighton Balbuena @ [phone]. *  Please give a list of your current medications to your Primary Care Provider. *  Please update this list whenever your medications are discontinued, doses are      changed, or new medications (including over-the-counter products) are added. *  Please carry medication information at all times in case of emergency situations. You can expect some pain and swelling around the cut (incision) the doctor made. This should get better within a few days after your surgery. But it is normal to have some pain for 2 to 3 weeks after surgery and mild pain for up to 6 weeks after surgery.   How soon you can return to work and your normal routine depends on your job and how long it takes the bone to heal. For example, if you have a fractured leg and you sit at work, you may be able to go back in 1 to 2 weeks. But if your job requires you to walk or stand a lot, you will need to wait until your fracture has healed before you go back to work. This care sheet gives you a general idea about how long it will take for you to recover. But each person recovers at a different pace. Follow the steps below to get better as quickly as possible. How can you care for yourself at home? Activity    · Rest when you feel tired. Getting enough sleep will help you recover.     · Increase your activity as recommended by your doctor. Being active boosts blood flow and helps prevent pneumonia and constipation. It is usually okay to exercise other parts of your body as soon as you feel well enough.     · Avoid putting weight on your repaired bone until your doctor says it is okay.     · You will probably need to take 1 to 2 weeks off from work. It depends on the type of work you do and how you feel.     · Do not shower for 1 or 2 days after surgery. When you shower, keep your dressing and incisions dry. If you have a cast, tape a sheet of plastic to cover it so that it does not get wet. It may help to sit on a shower stool.     · Do not take a bath, swim, use a hot tub, or soak your affected limb until your incision is healed. This usually takes 1 to 2 weeks. Diet    · You can eat your normal diet. If your stomach is upset, try bland, low-fat foods like plain rice, broiled chicken, toast, and yogurt. Medicines    · Your doctor will tell you if and when you can restart your medicines. He or she will also give you instructions about taking any new medicines.     · If you take blood thinners, such as warfarin (Coumadin), clopidogrel (Plavix), or aspirin, be sure to talk to your doctor.  He or she will tell you if and when to start taking those medicines again. Make sure that you understand exactly what your doctor wants you to do.     · Take pain medicines exactly as directed. ? If the doctor gave you a prescription medicine for pain, take it as prescribed. ? If you are not taking a prescription pain medicine, ask your doctor if you can take an over-the-counter medicine.     · If you think your pain medicine is making you sick to your stomach:  ? Take your medicine after meals (unless your doctor has told you not to). ? Ask your doctor for a different pain medicine.     · If your doctor prescribed antibiotics, take them as directed. Do not stop taking them just because you feel better. You need to take the full course of antibiotics. Incision care    · If you have strips of tape on the incision, leave the tape on for a week or until it falls off.     · If you do not have a cast, clean the incision 2 times a day after your doctor allows you to remove the bandage. Use only soap and water to clean the incision unless your doctor gives you different instructions. Don't use hydrogen peroxide or alcohol, which can slow healing. Exercise    · Do exercises as instructed by your doctor or physical therapist. These exercises will help keep your muscles strong and your joints flexible while your bone is healing.     · Wiggle your fingers or toes on the injured arm or leg often. This helps reduce swelling and stiffness. Ice and elevation    · Prop up the injured arm or leg on a pillow when you ice it or anytime you sit or lie down during the first 1 to 2 weeks after your surgery. Try to keep it above the level of your heart. This will help reduce swelling and pain. Other instructions    · If you have a cast or splint:  ? Keep it dry. ? If you have a removable splint, ask your doctor if it is okay to take it off to bathe. Your doctor may want you to keep it on as much as possible. Be careful not to put the splint on too tight.   ? Do not stick objects such as pencils or coat hangers in your cast or splint to scratch your skin. ? Do not put powder into your cast or splint to relieve itchy skin. ? Never cut or alter your cast or splint. Follow-up care is a key part of your treatment and safety. Be sure to make and go to all appointments, and call your doctor if you are having problems. It's also a good idea to know your test results and keep a list of the medicines you take. When should you call for help? Call 911 anytime you think you may need emergency care. For example, call if:    · You passed out (lost consciousness).     · You have severe trouble breathing.     · You have sudden chest pain and shortness of breath, or you cough up blood.    Call your doctor now or seek immediate medical care if:    · You have pain that does not get better after you take pain medicine.     · Your fingers or toes on the injured arm or leg are cool, pale, or change color.     · You have tingling or numbness in your fingers or toes.     · You cannot move your fingers or toes.     · Your cast or splint feels too tight.     · The skin under your cast or splint is burning or stinging.     · You have signs of infection, such as:  ? Increased pain, swelling, warmth, or redness. ? Red streaks leading from the incision. ? Pus draining from the incision. ? A fever.     · You have drainage or a bad smell coming from the cast or splint.     · You have signs of a blood clot, such as:  ? Pain in your calf, back of the knee, thigh, or groin. ? Redness and swelling in your leg or groin.    Watch closely for any changes in your health, and be sure to contact your doctor if:    · You have any problems with your cast or splint. Where can you learn more? Go to http://odilon-shahzad.info/. Enter F398 in the search box to learn more about \"Open Reduction With Internal Fixation of a Limb: What to Expect at Home. \"  Current as of: September 20, 2018  Content Version: 12.1  © 5628-0053 [employer]. Care instructions adapted under license by [employer] (which disclaims liability or warranty for this information). If you have questions about a medical condition or this instruction, always ask your healthcare professional. [address] any warranty or liability for your use of this information.

## 2019-09-13 NOTE — PROGRESS NOTES
09/13/19 0801   Oxygen Therapy   O2 Sat (%) 97 %   O2 Device Room air   Incentive Spirometry Treatment   Actual Volume (ml) 2250 ml   Number of Attempts 1
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
## 2019-09-13 NOTE — PROGRESS NOTES
Shift assessment complete. Call light within reach, side rails up x3, wheels locked, and bed low to ground. No neurovascular deficits noted. Pain controlled at this time. IS at bedside. No needs at this time.
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
## 2020-01-30 PROBLEM — T84.84XA PAINFUL ORTHOPAEDIC HARDWARE (HCC): Status: ACTIVE | Noted: 2020-01-30

## 2020-01-30 NOTE — BRIEF OP NOTE
BRIEF OPERATIVE NOTE    Date of Procedure: 2/6/2020     Preoperative Diagnosis:  PAINFUL HARDWARE RIGHT SHOULDER    Postoperative Diagnosis:  SAME    Procedure(s): HARDWARE REMOVAL RIGHT SHOULDER    Surgeon(s) and Role:     * Dawn Sanchez MD - Primary         Assistant Staff:  Violetta Ayala NP      Surgical Staff:  Circ-1: (Unknown)  Scrub Tech-1: (Unknown)  Scrub Tech-2: (Unknown)  Scrub Tech-3: (Unknown)  No case tracking events are documented in the log.     Anesthesia:  GENERAL WITH INTERSCALENE BLOCK    Estimated Blood Loss: 20 cc    Complications: Anuel Antoine MD

## 2020-01-30 NOTE — H&P
[facility] HISTORY AND PHYSICAL    Subjective:     Patient is a 30 y.o. RHD FEMALE WITH RIGHT SHOULDER PAIN. SEE OFFICE NOTE. Patient Active Problem List    Diagnosis Date Noted    Painful orthopaedic hardware [facility]) 01/30/2020    3-part fracture of surgical neck of right humerus, initial encounter for closed fracture 09/12/2019    3-part fracture of surgical neck of right humerus 09/12/2019    Closed 4-part fracture of surgical neck of right humerus 09/10/2019    History of abnormal cervical Pap smear 05/14/2018    Dysmenorrhea 05/10/2017     Past Medical History:   Diagnosis Date    Depression with anxiety     Managed with meds     GERD (gastroesophageal reflux disease)     managed with OTC meds as needed    H/O cold sores     Tailbone injury       Past Surgical History:   Procedure Laterality Date    HX REFRACTIVE SURGERY      HX TONSIL AND ADENOIDECTOMY        Prior to Admission medications    Medication Sig Start Date End Date Taking? Authorizing Provider   HYDROcodone-acetaminophen (NORCO)  mg tablet Take 1 Tab by mouth every six (6) hours as needed for Pain. Provider, Historical   ibuprofen (MOTRIN) 800 mg tablet Take 800 mg by mouth every six (6) hours as needed for Pain. Provider, Historical   escitalopram oxalate (LEXAPRO) 20 mg tablet Take 1 Tab by mouth daily. 8/28/19   Carlitos Small MD   escitalopram oxalate (LEXAPRO) 10 mg tablet Take 1 Tab by mouth daily. 5/22/19   Carlitos Small MD   PHILITH 0.4-35 mg-mcg tab Take 1 Tab by mouth daily. 5/22/19   Carlitos Small MD   levocetirizine (XYZAL) 5 mg tablet Take 5 mg by mouth daily. Provider, Historical   valACYclovir (VALTREX) 1 gram tablet Take 1,000 mg by mouth daily as needed.     Provider, Historical     Allergies   Allergen Reactions    Latex Rash    Nickel Hives    Sulfa (Sulfonamide Antibiotics) Hives      Social History     Tobacco Use    Smoking status: Never Smoker    Smokeless tobacco: Never Used   Substance Use Topics    Alcohol use: Yes     Comment: occ      Family History   Problem Relation Age of Onset    Thyroid Disease Mother     Heart Disease Father     Thyroid Disease Maternal Grandmother     Diabetes Paternal Grandmother     Heart Disease Paternal Grandfather       Review of Systems  A comprehensive review of systems was negative except for that written in the HPI. Objective:     No data found. Visit Vitals  Ht 5' 2\" (1.575 m)   Wt 68 kg (150 lb)   BMI 27.44 kg/m²     General:  Alert, cooperative, no distress, appears stated age. Head:  Normocephalic, without obvious abnormality, atraumatic. Back:   Symmetric, no curvature. ROM normal. No CVA tenderness. Lungs:   Clear to auscultation bilaterally. Chest wall:  No tenderness or deformity. Heart:  Regular rate and rhythm, S1, S2 normal, no murmur, click, rub or gallop. Extremities: Extremities normal, atraumatic, no cyanosis or edema. Pulses: 2+ and symmetric all extremities. Skin: Skin color, texture, turgor normal. No rashes or lesions. Lymph nodes: Cervical, supraclavicular, and axillary nodes normal.   Neurologic: CNII-XII intact. Normal strength, sensation and reflexes throughout. Assessment:     Principal Problem:    Painful orthopaedic hardware ([address] (1/30/2020)    PAINFUL HARDWARE RIGHT SHOULDER    Plan:     The various methods of treatment have been discussed with the patient and family. PATIENT HAS EXHAUSTED NON-OPERATIVE MODALITIES     After consideration of risks, benefits and other options for treatment, the patient has consented to surgical intervention.     SEE OFFICE NOTE    Estefania Dumas MD

## 2020-01-31 ENCOUNTER — HOSPITAL ENCOUNTER (OUTPATIENT)
Dept: SURGERY | Age: 30
Discharge: HOME | End: 2020-01-31

## 2020-02-03 VITALS — HEIGHT: 62 IN | BODY MASS INDEX: 29.44 KG/M2 | WEIGHT: 160 LBS

## 2020-02-03 NOTE — PERIOP NOTES
Patient verified name and . Order for consent found in EHR and matches case posting; patient verifies procedure. HARDWARE REMOVAL RIGHT SHOULDER    Type 1B surgery, PAT phone assessment complete. Orders  received. Labs per surgeon: HgbA1C and POC glucose DOS  Labs per anesthesia protocol: no additional lab work needed. Patient answered medical/surgical history questions at their best of ability. All prior to admission medications documented in [facility]. Patient instructed to take the following medications the day of surgery according to anesthesia guidelines with a small sip of water: Norco if needed, lexapro, xyzal and philith. Hold all vitamins 7 days prior to surgery and NSAIDS 5 days prior to surgery. Prescription meds to hold: none. Patient instructed on the following:  Arrive at A Entrance, time of arrival to be called the day before by 1700  NPO after midnight including gum, mints, and ice chips  Responsible adult must drive patient to the hospital, stay during surgery, and patient will need supervision 24 hours after anesthesia  Use antibacterial soap in shower the night before surgery and on the morning of surgery  All piercings must be removed prior to arrival.    Leave all valuables (money and jewelry) at home but bring insurance card and ID on  DOS. Do not wear make-up, nail polish, lotions, cologne, perfumes, powders, or oil on skin. Patient teach back successful and patient demonstrates knowledge of instruction.

## 2020-02-05 ENCOUNTER — ANESTHESIA EVENT (OUTPATIENT)
Dept: SURGERY | Age: 30
End: 2020-02-05
Payer: COMMERCIAL

## 2020-02-06 ENCOUNTER — HOSPITAL ENCOUNTER (OUTPATIENT)
Age: 30
Setting detail: OUTPATIENT SURGERY
Discharge: HOME | End: 2020-02-06
Attending: ORTHOPAEDIC SURGERY | Admitting: ORTHOPAEDIC SURGERY
Payer: COMMERCIAL

## 2020-02-06 ENCOUNTER — APPOINTMENT (OUTPATIENT)
Dept: GENERAL RADIOLOGY | Age: 30
End: 2020-02-06
Attending: ORTHOPAEDIC SURGERY
Payer: COMMERCIAL

## 2020-02-06 ENCOUNTER — ANESTHESIA (OUTPATIENT)
Dept: SURGERY | Age: 30
End: 2020-02-06
Payer: COMMERCIAL

## 2020-02-06 VITALS
TEMPERATURE: 100 F | RESPIRATION RATE: 18 BRPM | WEIGHT: 163 LBS | BODY MASS INDEX: 30 KG/M2 | SYSTOLIC BLOOD PRESSURE: 117 MMHG | DIASTOLIC BLOOD PRESSURE: 68 MMHG | HEART RATE: 103 BPM | HEIGHT: 62 IN | OXYGEN SATURATION: 99 %

## 2020-02-06 LAB
EST. AVERAGE GLUCOSE BLD GHB EST-MCNC: NORMAL MG/DL
GLUCOSE BLD STRIP.AUTO-MCNC: 83 MG/DL (ref 65–100)
HBA1C MFR BLD: 4.8 %
HCG UR QL: NEGATIVE

## 2020-02-06 PROCEDURE — 76210000020 HC REC RM PH II FIRST 0.5 HR: Performed by: ORTHOPAEDIC SURGERY

## 2020-02-06 PROCEDURE — 76010010054 HC POST OP PAIN BLOCK: Performed by: ORTHOPAEDIC SURGERY

## 2020-02-06 PROCEDURE — 77030040922 HC BLNKT HYPOTHRM STRY -A: Performed by: ANESTHESIOLOGY

## 2020-02-06 PROCEDURE — 77030031139 HC SUT VCRL2 J&J -A: Performed by: ORTHOPAEDIC SURGERY

## 2020-02-06 PROCEDURE — 77030018836 HC SOL IRR NACL ICUM -A: Performed by: ORTHOPAEDIC SURGERY

## 2020-02-06 PROCEDURE — 74011000250 HC RX REV CODE- 250: Performed by: NURSE ANESTHETIST, CERTIFIED REGISTERED

## 2020-02-06 PROCEDURE — 74011250636 HC RX REV CODE- 250/636: Performed by: ANESTHESIOLOGY

## 2020-02-06 PROCEDURE — 77030002937 HC SUT MERS J&J -B: Performed by: ORTHOPAEDIC SURGERY

## 2020-02-06 PROCEDURE — 73030 X-RAY EXAM OF SHOULDER: CPT

## 2020-02-06 PROCEDURE — 77030003602 HC NDL NRV BLK BBMI -B: Performed by: ANESTHESIOLOGY

## 2020-02-06 PROCEDURE — 76942 ECHO GUIDE FOR BIOPSY: CPT | Performed by: ORTHOPAEDIC SURGERY

## 2020-02-06 PROCEDURE — 77030037088 HC TUBE ENDOTRACH ORAL NSL COVD-A: Performed by: ANESTHESIOLOGY

## 2020-02-06 PROCEDURE — 77030012935 HC DRSG AQUACEL BMS -B: Performed by: ORTHOPAEDIC SURGERY

## 2020-02-06 PROCEDURE — 81025 URINE PREGNANCY TEST: CPT

## 2020-02-06 PROCEDURE — 76010000160 HC OR TIME 0.5 TO 1 HR INTENSV-TIER 1: Performed by: ORTHOPAEDIC SURGERY

## 2020-02-06 PROCEDURE — 77030013708 HC HNDPC SUC IRR PULS STRY –B: Performed by: ORTHOPAEDIC SURGERY

## 2020-02-06 PROCEDURE — 76210000063 HC OR PH I REC FIRST 0.5 HR: Performed by: ORTHOPAEDIC SURGERY

## 2020-02-06 PROCEDURE — 76060000033 HC ANESTHESIA 1 TO 1.5 HR: Performed by: ORTHOPAEDIC SURGERY

## 2020-02-06 PROCEDURE — 83036 HEMOGLOBIN GLYCOSYLATED A1C: CPT

## 2020-02-06 PROCEDURE — 77030039425 HC BLD LARYNG TRULITE DISP TELE -A: Performed by: ANESTHESIOLOGY

## 2020-02-06 PROCEDURE — 77030002913 HC SUT ETHBND J&J -B: Performed by: ORTHOPAEDIC SURGERY

## 2020-02-06 PROCEDURE — 77030002986 HC SUT PROL J&J -A: Performed by: ORTHOPAEDIC SURGERY

## 2020-02-06 PROCEDURE — 74011250636 HC RX REV CODE- 250/636: Performed by: NURSE PRACTITIONER

## 2020-02-06 PROCEDURE — 82962 GLUCOSE BLOOD TEST: CPT

## 2020-02-06 PROCEDURE — 74011250636 HC RX REV CODE- 250/636: Performed by: NURSE ANESTHETIST, CERTIFIED REGISTERED

## 2020-02-06 PROCEDURE — 77030011283 HC ELECTRD NDL COVD -A: Performed by: ORTHOPAEDIC SURGERY

## 2020-02-06 RX ORDER — LIDOCAINE HYDROCHLORIDE 10 MG/ML
0.1 INJECTION INFILTRATION; PERINEURAL AS NEEDED
Status: DISCONTINUED | OUTPATIENT
Start: 2020-02-06 | End: 2020-02-06 | Stop reason: HOSPADM

## 2020-02-06 RX ORDER — EPHEDRINE SULFATE/0.9% NACL/PF 50 MG/5 ML
SYRINGE (ML) INTRAVENOUS AS NEEDED
Status: DISCONTINUED | OUTPATIENT
Start: 2020-02-06 | End: 2020-02-06 | Stop reason: HOSPADM

## 2020-02-06 RX ORDER — ROCURONIUM BROMIDE 10 MG/ML
INJECTION, SOLUTION INTRAVENOUS AS NEEDED
Status: DISCONTINUED | OUTPATIENT
Start: 2020-02-06 | End: 2020-02-06 | Stop reason: HOSPADM

## 2020-02-06 RX ORDER — CEFAZOLIN SODIUM/WATER 2 G/20 ML
2 SYRINGE (ML) INTRAVENOUS ONCE
Status: COMPLETED | OUTPATIENT
Start: 2020-02-06 | End: 2020-02-06

## 2020-02-06 RX ORDER — SODIUM CHLORIDE 0.9 % (FLUSH) 0.9 %
5-40 SYRINGE (ML) INJECTION EVERY 8 HOURS
Status: DISCONTINUED | OUTPATIENT
Start: 2020-02-06 | End: 2020-02-06 | Stop reason: HOSPADM

## 2020-02-06 RX ORDER — LIDOCAINE HYDROCHLORIDE 20 MG/ML
INJECTION, SOLUTION EPIDURAL; INFILTRATION; INTRACAUDAL; PERINEURAL AS NEEDED
Status: DISCONTINUED | OUTPATIENT
Start: 2020-02-06 | End: 2020-02-06 | Stop reason: HOSPADM

## 2020-02-06 RX ORDER — SODIUM CHLORIDE, SODIUM LACTATE, POTASSIUM CHLORIDE, CALCIUM CHLORIDE 600; 310; 30; 20 MG/100ML; MG/100ML; MG/100ML; MG/100ML
75 INJECTION, SOLUTION INTRAVENOUS CONTINUOUS
Status: DISCONTINUED | OUTPATIENT
Start: 2020-02-06 | End: 2020-02-06 | Stop reason: HOSPADM

## 2020-02-06 RX ORDER — OXYCODONE AND ACETAMINOPHEN 5; 325 MG/1; MG/1
1 TABLET ORAL AS NEEDED
Status: DISCONTINUED | OUTPATIENT
Start: 2020-02-06 | End: 2020-02-06 | Stop reason: HOSPADM

## 2020-02-06 RX ORDER — DEXAMETHASONE SODIUM PHOSPHATE 4 MG/ML
INJECTION, SOLUTION INTRA-ARTICULAR; INTRALESIONAL; INTRAMUSCULAR; INTRAVENOUS; SOFT TISSUE
Status: COMPLETED | OUTPATIENT
Start: 2020-02-06 | End: 2020-02-06

## 2020-02-06 RX ORDER — GLYCOPYRROLATE 0.2 MG/ML
INJECTION INTRAMUSCULAR; INTRAVENOUS AS NEEDED
Status: DISCONTINUED | OUTPATIENT
Start: 2020-02-06 | End: 2020-02-06 | Stop reason: HOSPADM

## 2020-02-06 RX ORDER — HYDROMORPHONE HYDROCHLORIDE 2 MG/ML
0.5 INJECTION, SOLUTION INTRAMUSCULAR; INTRAVENOUS; SUBCUTANEOUS
Status: DISCONTINUED | OUTPATIENT
Start: 2020-02-06 | End: 2020-02-06 | Stop reason: HOSPADM

## 2020-02-06 RX ORDER — SODIUM CHLORIDE 0.9 % (FLUSH) 0.9 %
5-40 SYRINGE (ML) INJECTION AS NEEDED
Status: DISCONTINUED | OUTPATIENT
Start: 2020-02-06 | End: 2020-02-06 | Stop reason: HOSPADM

## 2020-02-06 RX ORDER — MIDAZOLAM HYDROCHLORIDE 1 MG/ML
2 INJECTION, SOLUTION INTRAMUSCULAR; INTRAVENOUS
Status: COMPLETED | OUTPATIENT
Start: 2020-02-06 | End: 2020-02-06

## 2020-02-06 RX ORDER — NALOXONE HYDROCHLORIDE 0.4 MG/ML
0.2 INJECTION, SOLUTION INTRAMUSCULAR; INTRAVENOUS; SUBCUTANEOUS AS NEEDED
Status: DISCONTINUED | OUTPATIENT
Start: 2020-02-06 | End: 2020-02-06 | Stop reason: HOSPADM

## 2020-02-06 RX ORDER — NEOSTIGMINE METHYLSULFATE 1 MG/ML
INJECTION, SOLUTION INTRAVENOUS AS NEEDED
Status: DISCONTINUED | OUTPATIENT
Start: 2020-02-06 | End: 2020-02-06 | Stop reason: HOSPADM

## 2020-02-06 RX ORDER — DEXAMETHASONE SODIUM PHOSPHATE 4 MG/ML
INJECTION, SOLUTION INTRA-ARTICULAR; INTRALESIONAL; INTRAMUSCULAR; INTRAVENOUS; SOFT TISSUE AS NEEDED
Status: DISCONTINUED | OUTPATIENT
Start: 2020-02-06 | End: 2020-02-06 | Stop reason: HOSPADM

## 2020-02-06 RX ORDER — PROPOFOL 10 MG/ML
INJECTION, EMULSION INTRAVENOUS AS NEEDED
Status: DISCONTINUED | OUTPATIENT
Start: 2020-02-06 | End: 2020-02-06 | Stop reason: HOSPADM

## 2020-02-06 RX ORDER — ONDANSETRON 2 MG/ML
INJECTION INTRAMUSCULAR; INTRAVENOUS AS NEEDED
Status: DISCONTINUED | OUTPATIENT
Start: 2020-02-06 | End: 2020-02-06 | Stop reason: HOSPADM

## 2020-02-06 RX ADMIN — ROPIVACAINE HYDROCHLORIDE 30 ML: 150 INJECTION, SOLUTION EPIDURAL; INFILTRATION; PERINEURAL at 06:41

## 2020-02-06 RX ADMIN — PROPOFOL 150 MG: 10 INJECTION, EMULSION INTRAVENOUS at 07:10

## 2020-02-06 RX ADMIN — Medication 10 MG: at 07:47

## 2020-02-06 RX ADMIN — LIDOCAINE HYDROCHLORIDE 60 MG: 20 INJECTION, SOLUTION EPIDURAL; INFILTRATION; INTRACAUDAL; PERINEURAL at 07:10

## 2020-02-06 RX ADMIN — Medication 10 MG: at 07:31

## 2020-02-06 RX ADMIN — PROPOFOL 50 MG: 10 INJECTION, EMULSION INTRAVENOUS at 07:11

## 2020-02-06 RX ADMIN — DEXAMETHASONE SODIUM PHOSPHATE 10 MG: 4 INJECTION, SOLUTION INTRAMUSCULAR; INTRAVENOUS at 07:21

## 2020-02-06 RX ADMIN — SODIUM CHLORIDE, SODIUM LACTATE, POTASSIUM CHLORIDE, AND CALCIUM CHLORIDE: 600; 310; 30; 20 INJECTION, SOLUTION INTRAVENOUS at 08:00

## 2020-02-06 RX ADMIN — Medication 2 G: at 07:06

## 2020-02-06 RX ADMIN — GLYCOPYRROLATE 0.4 MG: 0.2 INJECTION, SOLUTION INTRAMUSCULAR; INTRAVENOUS at 07:59

## 2020-02-06 RX ADMIN — MIDAZOLAM 2 MG: 1 INJECTION INTRAMUSCULAR; INTRAVENOUS at 06:39

## 2020-02-06 RX ADMIN — SODIUM CHLORIDE, SODIUM LACTATE, POTASSIUM CHLORIDE, AND CALCIUM CHLORIDE 75 ML/HR: 600; 310; 30; 20 INJECTION, SOLUTION INTRAVENOUS at 06:11

## 2020-02-06 RX ADMIN — DEXAMETHASONE SODIUM PHOSPHATE 4 MG: 4 INJECTION, SOLUTION INTRAMUSCULAR; INTRAVENOUS at 06:41

## 2020-02-06 RX ADMIN — Medication 3 MG: at 07:59

## 2020-02-06 RX ADMIN — ROCURONIUM BROMIDE 25 MG: 10 INJECTION, SOLUTION INTRAVENOUS at 07:11

## 2020-02-06 RX ADMIN — ONDANSETRON 4 MG: 2 INJECTION INTRAMUSCULAR; INTRAVENOUS at 07:21

## 2020-02-06 NOTE — ANESTHESIA POSTPROCEDURE EVALUATION
Procedure(s):  RIGHT SHOULDER HARDWARE REMOVAL/ INTERSCALENE  BEACH CHAIR, C-ARM, SYNTHES SMALL FRAG.     general    Anesthesia Post Evaluation      Multimodal analgesia: multimodal analgesia used between 6 hours prior to anesthesia start to PACU discharge  Patient location during evaluation: PACU  Patient participation: complete - patient participated  Level of consciousness: awake and alert  Pain score: 0  Pain management: adequate  Airway patency: patent  Anesthetic complications: no  Cardiovascular status: acceptable  Respiratory status: acceptable, spontaneous ventilation and nonlabored ventilation  Hydration status: acceptable  Post anesthesia nausea and vomiting:  none      Vitals Value Taken Time   /74 2/6/2020  8:39 AM   Temp 37.8 °C (100 °F) 2/6/2020  8:11 AM   Pulse 100 2/6/2020  8:39 AM   Resp 18 2/6/2020  8:39 AM   SpO2 97 % 2/6/2020  8:39 AM

## 2020-02-06 NOTE — ANESTHESIA PREPROCEDURE EVALUATION
Relevant Problems   No relevant active problems       Anesthetic History   No history of anesthetic complications            Review of Systems / Medical History  Patient summary reviewed, nursing notes reviewed and pertinent labs reviewed    Pulmonary  Within defined limits                 Neuro/Psych         Psychiatric history     Cardiovascular  Within defined limits                Exercise tolerance: >4 METS     GI/Hepatic/Renal     GERD: well controlled           Endo/Other  Within defined limits           Other Findings              Physical Exam    Airway  Mallampati: I    Neck ROM: normal range of motion   Mouth opening: Normal     Cardiovascular  Regular rate and rhythm,  S1 and S2 normal,  no murmur, click, rub, or gallop             Dental  No notable dental hx       Pulmonary  Breath sounds clear to auscultation               Abdominal         Other Findings            Anesthetic Plan    ASA: 2  Anesthesia type: general - interscalene block      Post-op pain plan if not by surgeon: peripheral nerve block single    Induction: Intravenous  Anesthetic plan and risks discussed with: Patient and Mother

## 2020-02-06 NOTE — DISCHARGE INSTRUCTIONS
ELBOW POST-OPERATIVE TIPS    You will have either sutures or an incision in various sites around the elbow. Use sling for comfort post-operatively, if you are given one. NO Drivin. While taking the prescription pain pills. 2. Until you are comfortable in a car with automatic transmission    Apply an ICE pack CONTINUOUSLY for 24-48 hours after surgery, then use either an ice pack or Automated Cold Therapy Unit a needed for discomfort. Bruising typically occurs after this procedure so do not be alarmed if you notice different stages of bruising: deep blue/black, brown, green, yellow skin discoloration above, below, or at the site of surgery. Follow all dressing care instructions provided by     The doctors nurse will contact you on the day after surge to monitor your progress. After general anesthesia or intravenous sedation, for 24 hours or while taking prescription Narcotics:  · Limit your activities  · A responsible adult needs to be with you for the next 24 hours  · Do not drive and operate hazardous machinery  · Do not make important personal or business decisions  · Do  not drink alcoholic beverages  · If you have not urinated within 8 hours after discharge, please contact your surgeon on call. · If you have sleep apnea and have a CPAP machine, please use it for all naps and sleeping. · Please use caution when taking narcotics and any of your home medications that may cause drowsiness. *  Please give a list of your current medications to your Primary Care Provider. *  Please update this list whenever your medications are discontinued, doses are      changed, or new medications (including over-the-counter products) are added. *  Please carry medication information at all times in case of emergency situations.     These are general instructions for a healthy lifestyle:  No smoking/ No tobacco products/ Avoid exposure to second hand smoke  Surgeon General's Warning:  Quitting smoking now greatly reduces serious risk to your health. Obesity, smoking, and sedentary lifestyle greatly increases your risk for illness  A healthy diet, regular physical exercise & weight monitoring are important for maintaining a healthy lifestyle    You may be retaining fluid if you have a history of heart failure or if you experience any of the following symptoms:  Weight gain of 3 pounds or more overnight or 5 pounds in a week, increased swelling in our hands or feet or shortness of breath while lying flat in bed. Please call your doctor as soon as you notice any of these symptoms; do not wait until your next office visit. Mark okeefe
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
## 2020-02-06 NOTE — H&P
Date of Surgery Update:  Loy Guillory was seen and examined. History and physical has been reviewed. The patient has been examined.  There have been no significant clinical changes since the completion of the originally dated History and Physical.    Signed By: Jacklyn Jaime MD     February 6, 2020 6:17 AM

## 2020-02-06 NOTE — ANESTHESIA PROCEDURE NOTES
Peripheral Block    Start time: 2/6/2020 6:39 AM  End time: 2/6/2020 6:41 AM  Performed by: Mert Morel MD  Authorized by: Mert Morel MD       Pre-procedure: Indications: at surgeon's request and post-op pain management    Preanesthetic Checklist: patient identified, risks and benefits discussed, site marked, timeout performed, anesthesia consent given and patient being monitored    Timeout Time: 06:39          Block Type:   Block Type:   Interscalene  Laterality:  Right  Monitoring:  Frequent vital sign checks, heart rate, oxygen, continuous pulse ox and responsive to questions  Injection Technique:  Single shot  Procedures: ultrasound guided and nerve stimulator    Patient Position: supine  Prep: chlorhexidine    Location:  Interscalene  Needle Type:  Stimuplex  Needle Gauge:  22 G  Needle Localization:  Nerve stimulator and ultrasound guidance  Motor Response: minimal motor response >0.4 mA      Assessment:  Number of attempts:  1  Injection Assessment:  Incremental injection every 5 mL, negative aspiration for CSF, no paresthesia, ultrasound image on chart, no intravascular symptoms, negative aspiration for blood and local visualized surrounding nerve on ultrasound  Patient tolerance:  Patient tolerated the procedure well with no immediate complications

## 2020-07-21 PROBLEM — J32.9 CHRONIC SINUSITIS: Status: ACTIVE | Noted: 2020-07-21

## 2022-03-18 PROBLEM — T84.84XA PAINFUL ORTHOPAEDIC HARDWARE (HCC): Status: ACTIVE | Noted: 2020-01-30

## 2022-03-19 PROBLEM — N94.6 DYSMENORRHEA: Status: ACTIVE | Noted: 2017-05-10

## 2022-03-19 PROBLEM — J32.9 CHRONIC SINUSITIS: Status: ACTIVE | Noted: 2020-07-21

## 2022-03-19 PROBLEM — S42.241A CLOSED 4-PART FRACTURE OF SURGICAL NECK OF RIGHT HUMERUS: Status: ACTIVE | Noted: 2019-09-10

## 2022-03-19 PROBLEM — S42.231A 3-PART FRACTURE OF SURGICAL NECK OF RIGHT HUMERUS: Status: ACTIVE | Noted: 2019-09-12

## 2022-03-20 PROBLEM — Z87.42 HISTORY OF ABNORMAL CERVICAL PAP SMEAR: Status: ACTIVE | Noted: 2018-05-14

## 2022-03-20 PROBLEM — S42.231A 3-PART FRACTURE OF SURGICAL NECK OF RIGHT HUMERUS, INITIAL ENCOUNTER FOR CLOSED FRACTURE: Status: ACTIVE | Noted: 2019-09-12

## 2022-12-01 RX ORDER — ESCITALOPRAM OXALATE 20 MG/1
TABLET ORAL
Qty: 30 TABLET | Refills: 5 | OUTPATIENT
Start: 2022-12-01

## (undated) DEVICE — GUARDIAN LVC: Brand: GUARDIAN

## (undated) DEVICE — SCREW BNE L40MM DIA3.5MM CORT S STL ST LOK FULL THRD
Type: IMPLANTABLE DEVICE | Site: SHOULDER | Status: NON-FUNCTIONAL
Removed: 2019-09-12

## (undated) DEVICE — DRAPE TWL SURG 16X26IN BLU ORB04] ALLCARE INC]

## (undated) DEVICE — SCREW BNE L26MM DIA3.5MM CORT S STL ST LOK FULL THRD
Type: IMPLANTABLE DEVICE | Site: SHOULDER | Status: NON-FUNCTIONAL
Removed: 2019-09-12

## (undated) DEVICE — CARDINAL HEALTH FLEXIBLE LIGHT HANDLE COVER: Brand: CARDINAL HEALTH

## (undated) DEVICE — SYRINGE CATH TIP 50ML

## (undated) DEVICE — SUTURE ETHBND EXCEL SZ 5 L30IN NONABSORBABLE GRN L40MM V-37 MB66G

## (undated) DEVICE — ABDOMINAL PAD: Brand: DERMACEA

## (undated) DEVICE — SUTURE 5 MERS GRN 30 TO 40 IN D9211

## (undated) DEVICE — SUTURE PROL SZ 2-0 L18IN NONABSORBABLE BLU FS L26MM 3/8 CIR 8685H

## (undated) DEVICE — REM POLYHESIVE ADULT PATIENT RETURN ELECTRODE: Brand: VALLEYLAB

## (undated) DEVICE — (D)PREP SKN CHLRAPRP APPL 26ML -- CONVERT TO ITEM 371833

## (undated) DEVICE — 2000CC GUARDIAN II: Brand: GUARDIAN

## (undated) DEVICE — STRIP,CLOSURE,WOUND,MEDI-STRIP,1/2X4: Brand: MEDLINE

## (undated) DEVICE — AMD ANTIMICROBIAL GAUZE SPONGES,12 PLY USP TYPE VII, 0.2% POLYHEXAMETHYLENE BIGUANIDE HCI (PHMB): Brand: CURITY

## (undated) DEVICE — AMD ANTIMICROBIAL BANDAGE ROLL,6 PLY: Brand: KERLIX

## (undated) DEVICE — SUTURE VCRL SZ 0 L27IN ABSRB UD L36MM CP-1 1/2 CIR REV CUT J267H

## (undated) DEVICE — SHEET, DRAPE, SPLIT, STERILE: Brand: MEDLINE

## (undated) DEVICE — 3M™ STERI-DRAPE™ INSTRUMENT POUCH 1018: Brand: STERI-DRAPE™

## (undated) DEVICE — SPONGE LAP 18X18IN STRL -- 5/PK

## (undated) DEVICE — ELECTRODE NDL 2.8IN COAT VALLEYLAB

## (undated) DEVICE — HANDPIECE SET WITH COAXIAL HIGH FLOW TIP AND SUCTION TUBE: Brand: INTERPULSE

## (undated) DEVICE — (D)STRIP SKN CLSR 0.5X4IN WHT --

## (undated) DEVICE — CUFF THER CRYO UNIV 32-48 IN FOR 81-122CM CHST CIRC AIRCAST

## (undated) DEVICE — Z DISCONTINUED PER MEDLINE GOWN ISOLATN 2XL BLU POLYPR CLS BK FLD PROTCT NK WAIST TIE

## (undated) DEVICE — GOWN,REINF,POLY,ECL,PP SLV,XL: Brand: MEDLINE

## (undated) DEVICE — INTEGUSEAL MICROBIAL SEALANT: Brand: AVANOS

## (undated) DEVICE — SCREW BNE L50MM DIA3.5MM CORT S STL ST LOK FULL THRD
Type: IMPLANTABLE DEVICE | Site: SHOULDER | Status: NON-FUNCTIONAL
Removed: 2019-09-12

## (undated) DEVICE — TOTAL SHOULDER DR POSTA: Brand: MEDLINE INDUSTRIES, INC.

## (undated) DEVICE — GARMENT,MEDLINE,DVT,INT,CALF,MED, GEN2: Brand: MEDLINE

## (undated) DEVICE — 3.5MM CORTEX SCREW SELF-TAPPING 28MM
Type: IMPLANTABLE DEVICE | Site: SHOULDER | Status: NON-FUNCTIONAL
Removed: 2019-09-12

## (undated) DEVICE — DISPOSABLE DRAPE, STERILE, FOR A CDS-3060 5 FOOT TABLE: Brand: PEDIGO PRODUCTS, INC.

## (undated) DEVICE — BANDAGE,GAUZE,BULKEE II,4.5"X4.1YD,STRL: Brand: MEDLINE

## (undated) DEVICE — SUTURE ETHBND EXCEL SZ 2 L27IN NONABSORBABLE GRN WHT MO-7 D7485

## (undated) DEVICE — COVER,MAYO STAND,STERILE: Brand: MEDLINE

## (undated) DEVICE — BANDAGE COMPR SELF ADH 5 YDX4 IN TAN STRL PREMIERPRO LF

## (undated) DEVICE — DRAPE,TOP,102X53,STERILE: Brand: MEDLINE

## (undated) DEVICE — SUTURE ETHBND 2 L30IN NONABSORBABLE GRN WHT LT GRN MO-7 D8793

## (undated) DEVICE — SOLUTION IRRIG 3000ML 0.9% SOD CHL FLX CONT 0797208] ICU MEDICAL INC]

## (undated) DEVICE — STOCKINETTE TUBE 6X48 -- MEDICHOICE

## (undated) DEVICE — DRAPE,U/SHT,SPLIT,FILM,60X84,STERILE: Brand: MEDLINE

## (undated) DEVICE — COVER,TABLE,HEAVY DUTY,79"X110",STRL: Brand: MEDLINE

## (undated) DEVICE — 3M™ STERI-DRAPE™ INCISE DRAPE 1050 (60CM X 45CM): Brand: STERI-DRAPE™

## (undated) DEVICE — Device

## (undated) DEVICE — HIGH CAPACITY FAN SPRAY TIP WITH SHIELD: Brand: PULSAVAC®

## (undated) DEVICE — PREP SKN CHLRAPRP APPL 10.5ML --

## (undated) DEVICE — SURGICAL PROCEDURE PACK BASIC ST FRANCIS

## (undated) DEVICE — Z DISCONTINUED PER MEDLINE USE 2741944 DRESSING AQUACEL 12 IN SURG W9XL30CM SIL CVR WTRPRF VIR BACT BARR ANTIMIC

## (undated) DEVICE — ARGYLE SIGMOID SURGICAL SUCTION INSTRUMENT 23 FR/CH (7.7 MM): Brand: ARGYLE

## (undated) DEVICE — GOWN,REINFORCED,POLY,AURORA,XXLARGE,STR: Brand: MEDLINE

## (undated) DEVICE — SUTURE PROL SZ 0 L30IN NONABSORBABLE BLU FSLX L36MM 3/8 CIR 8690H

## (undated) DEVICE — DRAPE XR C ARM 41X74IN LF --